# Patient Record
Sex: FEMALE | Race: WHITE
[De-identification: names, ages, dates, MRNs, and addresses within clinical notes are randomized per-mention and may not be internally consistent; named-entity substitution may affect disease eponyms.]

---

## 2017-01-05 ENCOUNTER — HOSPITAL ENCOUNTER (EMERGENCY)
Dept: HOSPITAL 83 - ED | Age: 27
Discharge: HOME | End: 2017-01-05
Payer: COMMERCIAL

## 2017-01-05 VITALS — BODY MASS INDEX: 41.65 KG/M2 | WEIGHT: 250 LBS | HEIGHT: 65 IN
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
## 2017-01-05 DIAGNOSIS — Z91.011: ICD-10-CM

## 2017-01-05 DIAGNOSIS — F17.200: ICD-10-CM
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
## 2017-01-05 DIAGNOSIS — M79.641: Primary | ICD-10-CM
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
## 2021-07-28 DIAGNOSIS — N63.0 BREAST LUMP: Primary | ICD-10-CM

## 2021-08-03 NOTE — PROGRESS NOTES
Subjective:      Patient ID: Martha Mon is a 27 y.o. female. HPI  History and Physical    Patient's Name/Date of Birth: Martha Mon / 1990    Date: August 3, 2021     Martha Mon presents for evaluation of a Right breast lesion    PCP: NIELS MURPHY CNP. Gynecologist:none. The lesion is located in the upper inner quadrant position of the Right breast. The lesion was discovered by self/exam. The patient has noted a change in BSE since presentation. Patient denies nipple discharge. Patient denies a personal history of breast cancer. Breast cancer risk factors include both sets grandparents with breast cancer age 61 and 48, great aunt breast cancer in her 63's . Ashkenazi Muslim Ancestry: No.    OBSTETRIC RELATED HISTORY:  Age of menarche was 15. Age of menopause was na    Patient denies hormonal therapy. Patient is . Age of first live birth was 24. Patient did breast feed. Is patient interested in fertility information about fertility preservation? No    CANCER SURVEILLANCE HISTORY:  Mammograms: Yes   Breast MRI's: No   Breast Biopsies: No   Colonoscopy: Not Applicable   GI Polyps: Not Applicable   EGD: Not Applicable   Pelvic Exam: Yes   Pap Smear: Yes   Dermatology: Yes   Lung screening: no      BMI 47.20 based on height 5'4\" and weight 275. No underlying associated co-morbidities. Working on weight loss. Bra Size: 44dd    Because violence is so common, we ask all our patients: are you in a relationship or do you live with a person who threatens, hurts, or controls you:  denies    Patient drinks moderate to large amount caffeinated beverages. Patient does not smoke cigarettes. Patient does not use recreational drugs. No past medical history on file. No past surgical history on file. No current outpatient medications on file. No current facility-administered medications for this visit. Not on File    No family history on file.     Social History     Socioeconomic History    Marital status: Unknown     Spouse name: Not on file    Number of children: Not on file    Years of education: Not on file    Highest education level: Not on file   Occupational History    Not on file   Tobacco Use    Smoking status: Not on file   Substance and Sexual Activity    Alcohol use: Not on file    Drug use: Not on file    Sexual activity: Not on file   Other Topics Concern    Not on file   Social History Narrative    Not on file     Social Determinants of Health     Financial Resource Strain:     Difficulty of Paying Living Expenses:    Food Insecurity:     Worried About Running Out of Food in the Last Year:     [address] in the Last Year:    Transportation Needs:     Lack of Transportation (Medical):  Lack of Transportation (Non-Medical):    Physical Activity:     Days of Exercise per Week:     Minutes of Exercise per Session:    Stress:     Feeling of Stress :    Social Connections:     Frequency of Communication with Friends and Family:     Frequency of Social Gatherings with Friends and Family:     Attends Evangelical Services:     Active Member of Clubs or Organizations:     Attends Club or Organization Meetings:     Marital Status:    Intimate Partner Violence:     Fear of Current or Ex-Partner:     Emotionally Abused:     Physically Abused:     Sexually Abused:        Occupation: , helps in school    Review of Systems   Constitutional: Negative for activity change, appetite change, chills, fatigue, fever and unexpected weight change. She generally feels well. Mild breast tenderness. Works full time; part time as a  and part time in the kitchen of the school. HENT: Negative for congestion, postnasal drip, rhinorrhea, sinus pressure, sinus pain, sore throat, trouble swallowing and voice change. Eyes: Negative for discharge, itching and visual disturbance.    Respiratory: Negative for cough, choking, chest tightness, shortness of breath and wheezing. Cardiovascular: Negative for chest pain, palpitations and leg swelling. Gastrointestinal: Negative for abdominal distention, abdominal pain, blood in stool, constipation, diarrhea, nausea and vomiting. Endocrine: Negative for cold intolerance and heat intolerance. Genitourinary: Negative for difficulty urinating, dysuria, frequency and hematuria. Musculoskeletal: Negative for arthralgias, back pain, gait problem, joint swelling, myalgias, neck pain and neck stiffness. Allergic/Immunologic: Negative for environmental allergies and food allergies. Neurological: Negative for dizziness, seizures, syncope, speech difficulty, weakness, light-headedness and headaches. Hematological: Negative for adenopathy. Does not bruise/bleed easily. Psychiatric/Behavioral: Negative for agitation, confusion and decreased concentration. The patient is not nervous/anxious. Patient denies previous history of DVT/PE. Objective:   Physical Exam  Vitals and nursing note reviewed. Constitutional:       General: She is not in acute distress. Appearance: She is well-developed. She is not diaphoretic. Comments: ECOG 0; Pleasant and cooperative   HENT:      Head: Normocephalic and atraumatic. Mouth/Throat:      Pharynx: No oropharyngeal exudate. Eyes:      General: No scleral icterus. Right eye: No discharge. Left eye: No discharge. Conjunctiva/sclera: Conjunctivae normal.   Neck:      Thyroid: No thyromegaly. Vascular: No JVD. Trachea: No tracheal deviation. Cardiovascular:      Rate and Rhythm: Normal rate and regular rhythm. Heart sounds: No murmur heard. No friction rub. No gallop. Pulmonary:      Effort: Pulmonary effort is normal. No respiratory distress or retractions. Breath sounds: Normal breath sounds. No stridor. No wheezing or rales.    Chest:      Chest wall: No mass, lacerations, deformity, swelling, tenderness or edema. Breasts: Breasts are asymmetrical (left breast 1/2 cup size larger than right.). Right: No inverted nipple, mass, nipple discharge, skin change or tenderness. Left: No inverted nipple, mass, nipple discharge, skin change or tenderness. Comments: Breast tissue with age appropriate density on the left. Right breast tissue is dense with an area of increased density in the 11:00 position. Abdominal:      General: There is no distension. Palpations: Abdomen is soft. Tenderness: There is no abdominal tenderness. There is no guarding or rebound. Musculoskeletal:         General: No tenderness or deformity. Normal range of motion. Right shoulder: Normal.      Left shoulder: Normal.      Cervical back: Normal range of motion and neck supple. Lymphadenopathy:      Cervical: No cervical adenopathy. Right cervical: No superficial, deep or posterior cervical adenopathy. Left cervical: No superficial, deep or posterior cervical adenopathy. Upper Body:      Right upper body: No pectoral adenopathy. Left upper body: No pectoral adenopathy. Skin:     General: Skin is warm and dry. Coloration: Skin is not pale. Findings: No erythema or rash. Neurological:      Mental Status: She is alert and oriented to person, place, and time. Coordination: Coordination normal.   Psychiatric:         Behavior: Behavior normal.         Thought Content: Thought content normal.         Judgment: Judgment normal.        Assessment:      27 y.o. extremely pleasant female whose risks for breast cancer include 2 paternal grandmothers and a maternal great aunt with breast cancer at relatively young ages (49-62 years old). Remainder of family history is not fully understood due to estrangement. She presents today for evaluation of a right breast density and breast tenderness.        8/4/2021: BILATERAL DIAGNOSTIC MAMMOGRAM: ; [facility]  FINDINGS:   Bilateral diagnostic mammogram: Breast tissue is heterogeneously dense which   may obscure small masses.  No suspicious mass, microcalcifications, or   architectural distortion identified in either breast.       Left diagnostic ultrasound: Targeted left breast ultrasound was performed   utilizing high-resolution, real-time scanning.  No suspicious cystic or solid   mass identified.           Impression   1.  No mammographic evidence of malignancy in either breast.       2.  No sonographic evidence of malignancy in the left breast.       Recommendation:       Given that the patient is high risk and complaining of symptoms in both   breasts, bilateral breast MRI with and without contrast is recommended to   entirely exclude neoplasm.       BIRADS:   BIRADS - CATEGORY 0       Incomplete: Needs Additional Imaging Evaluation       OVERALL ASSESSMENT - INCOMPLETE:NEED ADDITIONAL IMAGING EVALUATION.         FINDINGS:   Bilateral diagnostic mammogram: Breast tissue is heterogeneously dense which   may obscure small masses.  No suspicious mass, microcalcifications, or   architectural distortion identified in either breast.       Left diagnostic ultrasound: Targeted left breast ultrasound was performed   utilizing high-resolution, real-time scanning.  No suspicious cystic or solid   mass identified.           Impression   1.  No mammographic evidence of malignancy in either breast.       2.  No sonographic evidence of malignancy in the left breast.       Recommendation:       Given that the patient is high risk and complaining of symptoms in both   breasts, bilateral breast MRI with and without contrast is recommended to   entirely exclude neoplasm.       BIRADS:   BIRADS - CATEGORY 0       Incomplete: Needs Additional Imaging Evaluation       OVERALL ASSESSMENT - INCOMPLETE:NEED ADDITIONAL IMAGING EVALUATION.       A letter of notification will be sent to the patient regarding the results.       RISK ASSESSMENT:       During this patient's visit, information obtained was used to generate a   lifetime risk assessment using the Tyrer-Cuzick model (also called the SUJATA   International Breast Cancer Intervention study Breast Cancer Risk Evaluation   Tool).       LIFETIME RISK:       Patient has a Tyrer-Cuzick score of: 25.5%       BREAST TISSUE DENSITY       Heterogeneously dense (type 3 density)       HIGH RISK ( > 20% Lifetime Risk)       Clinically, she has a prominent area of increased tissue density in the right breast at the 11 o'clock position without distinct mass. No associated skin changes. No axillary lymphadenopathy. Imaging reviewed, including her BI-RADS result. In view of her inconclusive diagnostic imaging of the breast, clinical exam showing an area of increased tissue density, and high breast cancer [place] risk score of 25.5%, recommend an MRI of the bilateral breast.  Labs will be drawn today to ensure adequate Renal function. Questions were answered to her apparent satisfaction. Further recommendations will be made once MRI of the bilateral breast is complete. Plan:      1. Continue monthly breast self examination; detailed instructions reviewed today. Bring any changes to your physician's attention. 2. Continue healthy diet and exercise routinely as tolerated. 3. Avoid alcohol-reports she does not drink. 4. Limit caffeine intake. 5. Continue follow up with Primary Care and gynecology. 6. BUN and creatinine to be done today. 7. MRI of the bilateral breast to be done at this time. 8. Further recommendations will be made once MRI of the breast is complete. .      During today's visit, face-to-face time thank you 30 minutes, greater than 50% in counseling education and coordination of care. All questions were answered to her apparent satisfaction, and she is agreeable to the plan as outlined above.       This document is generated, in part, by voice recognition software and thus syntax and grammatical errors are possible. Caden Montoya, RN, MSN, APRN-CNP, [address] [place]  Advanced Oncology Certified Nurse Practitioner  Department of Breast Surgery  [facility]/  [facility] in collaboration with Dr. Dmitriy Naranjo.  Everardo/Dr. Matthias Talavera APRN-CNP          August 4, 2021

## 2021-08-04 ENCOUNTER — HOSPITAL ENCOUNTER (OUTPATIENT)
Dept: GENERAL RADIOLOGY | Age: 31
Discharge: HOME | End: 2021-08-06
Payer: MEDICAID

## 2021-08-04 ENCOUNTER — OFFICE VISIT (OUTPATIENT)
Dept: BREAST CENTER | Age: 31
End: 2021-08-04
Payer: MEDICAID

## 2021-08-04 ENCOUNTER — TELEPHONE (OUTPATIENT)
Dept: BREAST CENTER | Age: 31
End: 2021-08-04

## 2021-08-04 VITALS
RESPIRATION RATE: 18 BRPM | OXYGEN SATURATION: 98 % | WEIGHT: 275 LBS | DIASTOLIC BLOOD PRESSURE: 84 MMHG | BODY MASS INDEX: 46.95 KG/M2 | TEMPERATURE: 98 F | SYSTOLIC BLOOD PRESSURE: 138 MMHG | HEIGHT: 64 IN | HEART RATE: 80 BPM

## 2021-08-04 VITALS — WEIGHT: 275 LBS | BODY MASS INDEX: 46.95 KG/M2 | HEIGHT: 64 IN

## 2021-08-04 DIAGNOSIS — E66.01 CLASS 3 SEVERE OBESITY DUE TO EXCESS CALORIES WITHOUT SERIOUS COMORBIDITY WITH BODY MASS INDEX (BMI) OF 45.0 TO 49.9 IN ADULT (HCC): ICD-10-CM

## 2021-08-04 DIAGNOSIS — R92.2 DENSE BREAST: ICD-10-CM

## 2021-08-04 DIAGNOSIS — N63.0 BREAST LUMP: ICD-10-CM

## 2021-08-04 DIAGNOSIS — R92.8 ABNORMAL FINDINGS ON DIAGNOSTIC IMAGING OF BREAST: Primary | ICD-10-CM

## 2021-08-04 DIAGNOSIS — N63.0 LUMP OR MASS IN BREAST: ICD-10-CM

## 2021-08-04 DIAGNOSIS — R92.2 DENSE BREAST TISSUE ON MAMMOGRAM: ICD-10-CM

## 2021-08-04 DIAGNOSIS — R92.8 ABNORMAL FINDINGS ON DIAGNOSTIC IMAGING OF BREAST: ICD-10-CM

## 2021-08-04 DIAGNOSIS — Z80.3 FAMILY HISTORY OF BREAST CANCER: ICD-10-CM

## 2021-08-04 DIAGNOSIS — Z91.89 AT HIGH RISK FOR BREAST CANCER: ICD-10-CM

## 2021-08-04 LAB
BUN BLDV-MCNC: 12 MG/DL (ref 6–20)
CREAT SERPL-MCNC: 0.8 MG/DL (ref 0.5–1)
GFR AFRICAN AMERICAN: >60
GFR NON-AFRICAN AMERICAN: >60 ML/MIN/1.73

## 2021-08-04 PROCEDURE — G8427 DOCREV CUR MEDS BY ELIG CLIN: HCPCS | Performed by: NURSE PRACTITIONER

## 2021-08-04 PROCEDURE — 36415 COLL VENOUS BLD VENIPUNCTURE: CPT | Performed by: NURSE PRACTITIONER

## 2021-08-04 PROCEDURE — 77066 DX MAMMO INCL CAD BI: CPT

## 2021-08-04 PROCEDURE — 1036F TOBACCO NON-USER: CPT | Performed by: NURSE PRACTITIONER

## 2021-08-04 PROCEDURE — G8417 CALC BMI ABV UP PARAM F/U: HCPCS | Performed by: NURSE PRACTITIONER

## 2021-08-04 PROCEDURE — 76642 ULTRASOUND BREAST LIMITED: CPT

## 2021-08-04 PROCEDURE — 99203 OFFICE O/P NEW LOW 30 MIN: CPT | Performed by: NURSE PRACTITIONER

## 2021-08-04 ASSESSMENT — ENCOUNTER SYMPTOMS
WHEEZING: 0
DIARRHEA: 0
SHORTNESS OF BREATH: 0
VOICE CHANGE: 0
ABDOMINAL DISTENTION: 0
SINUS PRESSURE: 0
BLOOD IN STOOL: 0
EYE DISCHARGE: 0
SINUS PAIN: 0
BACK PAIN: 0
NAUSEA: 0
CONSTIPATION: 0
EYE ITCHING: 0
COUGH: 0
CHEST TIGHTNESS: 0
RHINORRHEA: 0
VOMITING: 0
CHOKING: 0
ABDOMINAL PAIN: 0
TROUBLE SWALLOWING: 0
SORE THROAT: 0

## 2021-08-04 NOTE — TELEPHONE ENCOUNTER
Patient left after her breast ultrasound without being seen in the [facility]. Called evelyne mercedes and she states she is able to turn around and come back.  Will await for patient's arrival

## 2021-08-04 NOTE — PATIENT INSTRUCTIONS

## 2021-08-04 NOTE — LETTER
[facility], [place], operated by [facility]  [address] [facility] [address] [address]  Phone: [phone]  Fax: [phone]    NIELS Todd CNP    August 4, 2021     [address]    Patient: Donna Devlin   MR Number: <[id number]>   YOB: 1990   Date of Visit: 8/4/2021       Dear Lizzie Tan:    Thank you for referring Donna Devlin to the office of to the office of Dr. Janice Bazan, Dr. Yoana Benavides, and Nurse Practitioner Omar Cm. Below are the relevant portions of my assessment and plan of care.    27 y.o. extremely pleasant female whose risks for breast cancer include 2 paternal grandmothers and a maternal great aunt with breast cancer at relatively young ages (49-62 years old). Remainder of family history is not fully understood due to estrangement. She presents today for evaluation of a right breast density and breast tenderness.        8/4/2021: BILATERAL DIAGNOSTIC MAMMOGRAM: ; [facility]  FINDINGS:   Bilateral diagnostic mammogram: Breast tissue is heterogeneously dense which   may obscure small masses.  No suspicious mass, microcalcifications, or   architectural distortion identified in either breast.       Left diagnostic ultrasound: Targeted left breast ultrasound was performed   utilizing high-resolution, real-time scanning.  No suspicious cystic or solid   mass identified.           Impression   1.  No mammographic evidence of malignancy in either breast.       2.  No sonographic evidence of malignancy in the left breast.       Recommendation:       Given that the patient is high risk and complaining of symptoms in both   breasts, bilateral breast MRI with and without contrast is recommended to   entirely exclude neoplasm.       BIRADS:   BIRADS - CATEGORY 0       Incomplete: Needs Additional Imaging Evaluation       OVERALL ASSESSMENT - INCOMPLETE:NEED ADDITIONAL IMAGING EVALUATION.         FINDINGS: Bilateral diagnostic mammogram: Breast tissue is heterogeneously dense which   may obscure small masses.  No suspicious mass, microcalcifications, or   architectural distortion identified in either breast.       Left diagnostic ultrasound: Targeted left breast ultrasound was performed   utilizing high-resolution, real-time scanning.  No suspicious cystic or solid   mass identified.           Impression   1.  No mammographic evidence of malignancy in either breast.       2.  No sonographic evidence of malignancy in the left breast.       Recommendation:       Given that the patient is high risk and complaining of symptoms in both   breasts, bilateral breast MRI with and without contrast is recommended to   entirely exclude neoplasm.       BIRADS:   BIRADS - CATEGORY 0       Incomplete: Needs Additional Imaging Evaluation       OVERALL ASSESSMENT - INCOMPLETE:NEED ADDITIONAL IMAGING EVALUATION.       A letter of notification will be sent to the patient regarding the results.       RISK ASSESSMENT:       During this patient's visit, information obtained was used to generate a   lifetime risk assessment using the Tyrer-Cuzick model (also called the SUJATA   International Breast Cancer Intervention study Breast Cancer Risk Evaluation   Tool).       LIFETIME RISK:       Patient has a Tyrer-Cuzick score of: 25.5%       BREAST TISSUE DENSITY       Heterogeneously dense (type 3 density)       HIGH RISK ( > 20% Lifetime Risk)       Clinically, she has a prominent area of increased tissue density in the right breast at the 11 o'clock position without distinct mass. No associated skin changes. No axillary lymphadenopathy. Imaging reviewed, including her BI-RADS result.   In view of her inconclusive diagnostic imaging of the breast, clinical exam showing an area of increased tissue density, and high breast cancer [place] risk score of 25.5%, recommend an MRI of the bilateral breast.  Labs will be drawn today to ensure adequate Renal function. Questions were answered to her apparent satisfaction. Further recommendations will be made once MRI of the bilateral breast is complete. 1. Continue monthly breast self examination; detailed instructions reviewed today. Bring any changes to your physician's attention. 2. Continue healthy diet and exercise routinely as tolerated. 3. Avoid alcohol-reports she does not drink. 4. Limit caffeine intake. 5. Continue follow up with Primary Care and gynecology. 6. BUN and creatinine to be done today. 7. MRI of the bilateral breast to be done at this time. 8. Further recommendations will be made once MRI of the breast is complete. .    This document is generated, in part, by voice recognition software and thus syntax and grammatical errors are possible. August 4, 2021    If you have questions, please do not hesitate to call me. Thank  You for involving us in the care of this extremely pleasant young woman. We look forward to following Fidelina along with you. Sincerely,      Shantel Salcido, RN, MSN, APRN-CNP, [address] [place]  Advanced Oncology Certified Nurse Practitioner  Department of Breast Surgery  [facility] [place]/  [facility] in collaboration with Dr. Sid Lezama.  Everardo/Dr. Cedric Zhao APRN-CNP      Pastor Bacon, APRN - CNP

## 2021-08-05 ENCOUNTER — TELEPHONE (OUTPATIENT)
Dept: BREAST CENTER | Age: 31
End: 2021-08-05

## 2021-08-05 NOTE — TELEPHONE ENCOUNTER
Referral has been submitted to [facility]. Patient information has been faxed. Their off will contact patient with an appointment.

## 2021-08-06 ENCOUNTER — TELEPHONE (OUTPATIENT)
Dept: BREAST CENTER | Age: 31
End: 2021-08-06

## 2021-08-06 NOTE — TELEPHONE ENCOUNTER
PAtient information has been faxed to [place] [phone] insurance - Clinical review. [facility]#[id number] - request has been marked urgent. May take 3-5 business days.

## 2021-08-09 ENCOUNTER — TELEPHONE (OUTPATIENT)
Dept: BREAST CENTER | Age: 31
End: 2021-08-09

## 2021-08-09 NOTE — TELEPHONE ENCOUNTER
Prior authorization has been obtained for breast MRI [address]75 per [facility] community plan. Approval # [id number]  Valid until 9/20/21.

## 2021-09-01 ENCOUNTER — TELEPHONE (OUTPATIENT)
Dept: BREAST CENTER | Age: 31
End: 2021-09-01

## 2021-09-15 ENCOUNTER — TELEPHONE (OUTPATIENT)
Dept: BREAST CENTER | Age: 31
End: 2021-09-15

## 2021-09-16 ENCOUNTER — TELEPHONE (OUTPATIENT)
Dept: BREAST CENTER | Age: 31
End: 2021-09-16

## 2021-09-16 NOTE — TELEPHONE ENCOUNTER
Attempted to reach patient in reference to her breast MRI. Voicemail box is not set up to leave message. Will attempt again later. 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 Will check to see if there any days or times that could work for the patient and check with the MRI department once patient returns the call.

## 2021-09-16 NOTE — TELEPHONE ENCOUNTER
RANULFO Quintero    9/16/21 10:50 AM  Note     Attempted to reach patient in reference to her breast MRI. Voicemail box is not set up to leave message. Will attempt again later. 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 Will check to see if there any days or times that could work for the patient and check with the MRI department once patient returns the call.

## 2021-09-16 NOTE — TELEPHONE ENCOUNTER
MRI order brought back by [address] [place] [address]  We were informed that the dates and times offered won't work with the patient due to her work schedule and lack of coverage. I touched base with the patient to see what dates and times could potentially work for her. She hoped to be able to come in 3:45pm or later as she is also an hour away. I spoke with Jeevan Christensen in the MRI department to check on the time requested by the patient. Coverage is not available at this time to accommodate later hours but offered the times they have currently have available. I reviewed the dates and times with the patient again but she is unable to make it work with the lack of coverage. Patient will wait another month and hopes to have someone in place to cover at work in order to complete the breast MRI. Patient aware to call us Day 1 of her next menstrual cycle.

## 2021-12-16 ENCOUNTER — TELEPHONE (OUTPATIENT)
Dept: BREAST CENTER | Age: 31
End: 2021-12-16

## 2021-12-16 NOTE — TELEPHONE ENCOUNTER
Attempted to call patient but unable to leave voice message (not set up). Patient is still in need of her breast MRI. The test will need re-authorzed.  Will try to call at another time

## 2022-01-24 ENCOUNTER — TELEPHONE (OUTPATIENT)
Dept: BREAST CENTER | Age: 32
End: 2022-01-24

## 2022-01-24 NOTE — TELEPHONE ENCOUNTER
Attempted to call patient in reference to her breast MRI. Unable to leave message. Letter will be sent in attempt to reach her.

## 2022-02-11 ENCOUNTER — TELEPHONE (OUTPATIENT)
Dept: BREAST CENTER | Age: 32
End: 2022-02-11

## 2022-02-11 NOTE — TELEPHONE ENCOUNTER
Attempted to contact patient ( voice mail not set up yet). Patient has not contacted us in regards to letter also sent  to discuss scheduling her breast MRI. Will notify referring provider also.